# Patient Record
Sex: FEMALE | Race: WHITE | Employment: OTHER | URBAN - NONMETROPOLITAN AREA
[De-identification: names, ages, dates, MRNs, and addresses within clinical notes are randomized per-mention and may not be internally consistent; named-entity substitution may affect disease eponyms.]

---

## 2022-01-29 DIAGNOSIS — C78.7 METASTATIC CANCER TO LIVER (HCC): ICD-10-CM

## 2022-01-29 DIAGNOSIS — C78.7 METASTATIC CANCER TO LIVER (HCC): Primary | ICD-10-CM

## 2022-01-29 RX ORDER — DRONABINOL 2.5 MG/1
2.5 CAPSULE ORAL
Qty: 60 CAPSULE | Refills: 1 | Status: SHIPPED | OUTPATIENT
Start: 2022-01-29 | End: 2022-01-29 | Stop reason: SDUPTHER

## 2022-01-29 RX ORDER — DRONABINOL 2.5 MG/1
2.5 CAPSULE ORAL
Qty: 60 CAPSULE | Refills: 1 | Status: SHIPPED | OUTPATIENT
Start: 2022-01-29 | End: 2022-02-28

## 2022-02-02 NOTE — TELEPHONE ENCOUNTER
([facility] [address]     Hydronephrosis     Kidney stone     Liver cancer ([facility] [address]     Portal vein thrombosis     Primary hypertension       Past Surgical History:   Procedure Laterality Date    APPENDECTOMY      CATARACT REMOVAL WITH IMPLANT Bilateral     CHOLECYSTECTOMY      CYSTOSCOPY      DENTAL SURGERY      INTERNAL RADIATION      JOINT REPLACEMENT      LIVER BIOPSY      OPTIC NERVE DECOMPRESSION      TONSILLECTOMY      URETER STENT PLACEMENT         Family History   Problem Relation Age of Onset    Kidney Cancer Mother     Cancer Father        Social History     Tobacco Use    Smoking status: Former Smoker     Quit date:      Years since quittin.0    Smokeless tobacco: Never Used   Substance Use Topics    Alcohol use: Not Currently      Allergies   Allergen Reactions    Adhesive Tape     Dye [Iodides]        Health Maintenance   Topic Date Due    Hepatitis C screen  Never done    Depression Screen  Never done    COVID-19 Vaccine (1) Never done    DTaP/Tdap/Td vaccine (1 - Tdap) Never done    Lipid screen  Never done    Colon cancer screen colonoscopy  Never done    Breast cancer screen  Never done    Shingles Vaccine (1 of 2) Never done    DEXA (modify frequency per FRAX score)  Never done    Pneumococcal 65+ yrs at Risk Vaccine (1 of 2 - PCV13) Never done    Flu vaccine (1) Never done    Hepatitis A vaccine  Aged Out    Hepatitis B vaccine  Aged Out    Hib vaccine  Aged Out    Meningococcal (ACWY) vaccine  Aged Out       Subjective:      Review of Systems   Constitutional: Positive for activity change, appetite change, fatigue and unexpected weight change. Negative for chills and fever. HENT: Negative for congestion, rhinorrhea and sore throat. Eyes: Negative for redness and visual disturbance. Respiratory: Positive for shortness of breath (with exerion). Negative for cough and wheezing. Cardiovascular: Positive for leg swelling. Negative for chest pain. Gastrointestinal: Positive for abdominal distention and abdominal pain. Negative for constipation, diarrhea, nausea and vomiting. Endocrine: Negative for polydipsia and polyuria. Genitourinary: Negative for dysuria, frequency and hematuria. Musculoskeletal: Negative for arthralgias, gait problem and myalgias. Skin: Negative for rash and wound. Allergic/Immunologic: Negative for environmental allergies and immunocompromised state. Neurological: Positive for weakness. Negative for dizziness, light-headedness and headaches. Hematological: Bruises/bleeds easily. Psychiatric/Behavioral: Negative for dysphoric mood and sleep disturbance. The patient is not nervous/anxious. Objective:     Physical Exam  Vitals and nursing note reviewed. Constitutional:       General: She is not in acute distress. Appearance: She is well-developed. HENT:      Head: Normocephalic and atraumatic. Right Ear: External ear normal.      Left Ear: External ear normal.      Nose: Nose normal.      Mouth/Throat:      Mouth: Mucous membranes are dry. Pharynx: No posterior oropharyngeal erythema. Eyes:      General: No scleral icterus. Right eye: No discharge. Left eye: No discharge. Conjunctiva/sclera: Conjunctivae normal.   Neck:      Thyroid: No thyromegaly. Vascular: No JVD. Cardiovascular:      Rate and Rhythm: Normal rate and regular rhythm. Heart sounds: Normal heart sounds. Pulmonary:      Effort: Pulmonary effort is normal. No respiratory distress. Breath sounds: No stridor. Rales (few in bases) present. No wheezing. Chest:   Breasts:      Right: No supraclavicular adenopathy. Left: No supraclavicular adenopathy. Abdominal:      General: Bowel sounds are normal. There is distension. Palpations: Abdomen is soft. There is hepatomegaly. Tenderness: There is abdominal tenderness in the right upper quadrant.    Musculoskeletal: General: No deformity. Normal range of motion. Right shoulder: No tenderness or bony tenderness. Left shoulder: No tenderness or bony tenderness. Cervical back: Neck supple. No tenderness or bony tenderness. Thoracic back: No spasms, tenderness or bony tenderness. Lumbar back: No tenderness or bony tenderness. Right lower le+ Pitting Edema present. Left lower le+ Pitting Edema present. Lymphadenopathy:      Cervical: No cervical adenopathy. Upper Body:      Right upper body: No supraclavicular adenopathy. Left upper body: No supraclavicular adenopathy. Skin:     General: Skin is warm and dry. Findings: No erythema or rash. Neurological:      Mental Status: She is alert and oriented to person, place, and time. Motor: No atrophy, abnormal muscle tone or seizure activity. Psychiatric:         Attention and Perception: Attention normal.         Speech: Speech normal.         Behavior: Behavior normal.         Cognition and Memory: Cognition normal.       BP (!) 131/58   Pulse 76   Temp 98 °F (36.7 °C)   Resp 18   Wt 194 lb 6.4 oz (88.2 kg)   SpO2 95%     Assessment/Plan      1. Metastatic cancer to liver ([facility] [address]    2. Primary hypertension    3. Graves disease    4. Acute deep vein thrombosis (DVT) of calf muscle vein of left lower extremity (HCC)    5. Adrenal insufficiency ([address]    6. History of COVID-19      1. Therapy to eval and treat. Follows with oncology. Pain control, marinol for appetite  2. bp controlled. Cont lisinopril/hctz. Add aldactone. Repeat bmp in 1 wk  3. Cont synthroid. Follows with endo  4. Elevate. Cont eliquis  5. Cont hydrocortisone as prescribed. Monitor labs  6. Symptomatic control. Vitamin regimen    Patient seen and examined. History partially obtained by chart review and nursing notes. I have reviewed patient's past medical, surgical, social, and family history and have made updates where appropriate.   See facility EMR for updated medication list.    On this date 2/1/2022 I have spent 50 minutes reviewing previous notes, test results and face to face (virtual) with the patient discussing the diagnosis and importance of compliance with the treatment plan as well as documenting on the day of the visit.      Electronically signed by Pricilla Quintero MD on 2/1/2022 at 7:55 PM

## 2023-01-11 ENCOUNTER — OFFICE VISIT (OUTPATIENT)
Dept: OBGYN CLINIC | Age: 73
End: 2023-01-11
Payer: MEDICARE

## 2023-01-11 VITALS
HEIGHT: 61 IN | WEIGHT: 174.6 LBS | SYSTOLIC BLOOD PRESSURE: 120 MMHG | BODY MASS INDEX: 32.97 KG/M2 | DIASTOLIC BLOOD PRESSURE: 60 MMHG

## 2023-01-11 DIAGNOSIS — N88.8 FRIABLE CERVIX: ICD-10-CM

## 2023-01-11 DIAGNOSIS — N81.2 CERVICAL PROLAPSE: Primary | ICD-10-CM

## 2023-01-11 DIAGNOSIS — Z46.89 FITTING AND ADJUSTMENT OF PESSARY: ICD-10-CM

## 2023-01-11 PROCEDURE — G8484 FLU IMMUNIZE NO ADMIN: HCPCS | Performed by: ADVANCED PRACTICE MIDWIFE

## 2023-01-11 PROCEDURE — 99203 OFFICE O/P NEW LOW 30 MIN: CPT | Performed by: ADVANCED PRACTICE MIDWIFE

## 2023-01-11 PROCEDURE — 1090F PRES/ABSN URINE INCON ASSESS: CPT | Performed by: ADVANCED PRACTICE MIDWIFE

## 2023-01-11 PROCEDURE — 1123F ACP DISCUSS/DSCN MKR DOCD: CPT | Performed by: ADVANCED PRACTICE MIDWIFE

## 2023-01-11 PROCEDURE — 57160 INSERT PESSARY/OTHER DEVICE: CPT | Performed by: ADVANCED PRACTICE MIDWIFE

## 2023-01-11 PROCEDURE — G8400 PT W/DXA NO RESULTS DOC: HCPCS | Performed by: ADVANCED PRACTICE MIDWIFE

## 2023-01-11 PROCEDURE — 1036F TOBACCO NON-USER: CPT | Performed by: ADVANCED PRACTICE MIDWIFE

## 2023-01-11 PROCEDURE — G8417 CALC BMI ABV UP PARAM F/U: HCPCS | Performed by: ADVANCED PRACTICE MIDWIFE

## 2023-01-11 PROCEDURE — G8427 DOCREV CUR MEDS BY ELIG CLIN: HCPCS | Performed by: ADVANCED PRACTICE MIDWIFE

## 2023-01-11 PROCEDURE — 3017F COLORECTAL CA SCREEN DOC REV: CPT | Performed by: ADVANCED PRACTICE MIDWIFE

## 2023-01-11 RX ORDER — SPIRONOLACTONE 25 MG/1
25 TABLET ORAL DAILY
COMMUNITY

## 2023-01-11 RX ORDER — LEVOTHYROXINE SODIUM 0.1 MG/1
TABLET ORAL
COMMUNITY
Start: 2022-12-10

## 2023-01-11 RX ORDER — ONDANSETRON 4 MG/1
4 TABLET, FILM COATED ORAL EVERY 8 HOURS PRN
COMMUNITY

## 2023-01-11 RX ORDER — LOPERAMIDE HYDROCHLORIDE 2 MG/1
2 CAPSULE ORAL 4 TIMES DAILY PRN
COMMUNITY

## 2023-01-11 RX ORDER — FUROSEMIDE 40 MG/1
TABLET ORAL
COMMUNITY
Start: 2023-01-10

## 2023-01-11 RX ORDER — LISINOPRIL AND HYDROCHLOROTHIAZIDE 25; 20 MG/1; MG/1
TABLET ORAL
COMMUNITY
Start: 2022-11-09

## 2023-01-11 ASSESSMENT — ENCOUNTER SYMPTOMS
RESPIRATORY NEGATIVE: 1
GASTROINTESTINAL NEGATIVE: 1

## 2023-01-11 NOTE — PROGRESS NOTES
PROBLEM VISIT     Date of service: 2023    Agustina Reid  Is a 74 y.o.  female    PT's PCP is: No primary care provider on file. : 1950                                          HPI Here as new patient for evaluation. Reports for approx 4 months has had more vaginal pressure and 1 month has had spotting in depends. Has increased vaginal pressure with sitting and coughing/sneezing. Recently met with oncologist (Dr Amelia Mejia) and \"not releasing fluid so have not started lasix yet but they need to drain some fluid off also\". On trial medication for cancer - will call their office to find out list.  Not sexually active    Review of Systems   Constitutional: Negative. HENT: Negative. Respiratory: Negative. Gastrointestinal: Negative. Genitourinary:  Positive for frequency, vaginal bleeding and vaginal pain. Negative for difficulty urinating, dysuria, flank pain and urgency. Neurological: Negative. Psychiatric/Behavioral: Negative. No LMP recorded.  Patient is postmenopausal.   OB History    Para Term  AB Living   3       1 3   SAB IAB Ectopic Molar Multiple Live Births   1         3      # Outcome Date GA Lbr Sawyer/2nd Weight Sex Delivery Anes PTL Lv   3             2             1 SAB                 Social History     Tobacco Use   Smoking Status Former    Types: Cigarettes    Quit date: 2017    Years since quittin.0   Smokeless Tobacco Never        Social History     Substance and Sexual Activity   Alcohol Use Not Currently       Allergies: Adhesive tape and Dye [iodides]      Current Outpatient Medications:     furosemide (LASIX) 40 MG tablet, , Disp: , Rfl:     levothyroxine (SYNTHROID) 100 MCG tablet, , Disp: , Rfl:     lisinopril-hydroCHLOROthiazide (PRINZIDE;ZESTORETIC) 20-25 MG per tablet, , Disp: , Rfl:     spironolactone (ALDACTONE) 25 MG tablet, Take 25 mg by mouth daily, Disp: , Rfl:     loperamide (IMODIUM) 2 MG capsule, Take 2 mg by mouth 4 times daily as needed for Diarrhea, Disp: , Rfl:     ondansetron (ZOFRAN) 4 MG tablet, Take 4 mg by mouth every 8 hours as needed for Nausea or Vomiting, Disp: , Rfl:     Social History     Substance and Sexual Activity   Sexual Activity Not on file       Chief Complaint   Patient presents with    New Patient     Pt c/o frequent urination and is on diuretics. Pt states \"something is coming out and is causing bleeding. \"        Physical Exam  Constitutional:       Appearance: Normal appearance. She is obese. Genitourinary:      Genitourinary Comments: When performed examination - noted that cervix and part of uterus has protruded through vaginal opening. Cervix friable and blood is not from center of cervix but from outer edges - likely from irritation from depends wearing. With pressure this prolapse is able to be reinserted into vagina but within 5 minutes it is again prolapsed. Dr Melina Linton to room to assist with examination - applied silver nitrate to cervix for hemostasis. #4 ring with support pessary inserted without difficulty which did seem to hold prolapse well. Patient ambulated in room and reported \"much better\". Cervical friability (cervix found to be friable and excoriated) present. HENT:      Head: Normocephalic. Eyes:      Pupils: Pupils are equal, round, and reactive to light. Pulmonary:      Effort: Pulmonary effort is normal.   Abdominal:      Comments: Distended - ascites   Musculoskeletal:         General: Swelling (Significant pitting bilateral lower extremity edema) present. Right lower leg: Edema present. Left lower leg: Edema present. Neurological:      Mental Status: She is alert and oriented to person, place, and time. Skin:     General: Skin is warm and dry. Psychiatric:         Behavior: Behavior normal.   Vitals and nursing note reviewed. Vital Signs  Blood pressure 120/60, height 5' 1\" (1.549 m), weight 174 lb 9.6 oz (79.2 kg). Assessment and Plan          Diagnosis Orders   1. Cervical prolapse        2. Fitting and adjustment of pessary        3. Friable cervix            Patient leaving town until early next week - return in 2 weeks for check to see if able to continue with this use. May need a #5 with support. Reviewed if pessary does spontaneously expel to call office  Reviewed s/s to call office for  Not a surgical candidate per Dr Rivera Breath      I am having Loretta Jose maintain her furosemide, levothyroxine, lisinopril-hydroCHLOROthiazide, spironolactone, loperamide, and ondansetron. Return pessary check. She was also counseled on her preventative health maintenance recommendations and follow-up. There are no Patient Instructions on file for this visit.     SAVITA Pierson CNM,1/11/2023 12:16 PM                     Electronically signed by SAVITA Pierson CNM

## 2023-01-12 ENCOUNTER — TELEPHONE (OUTPATIENT)
Dept: OBGYN CLINIC | Age: 73
End: 2023-01-12

## 2023-01-12 NOTE — TELEPHONE ENCOUNTER
Please call over to [facility] - I would ideally like most recent MRI results and office notes for patient current state and prognosis with plan of care. She also reported that Dr Negar Zavala is prescribing some new trial type medications for her - can you get a complete list of medications for her.   Thank you

## 2023-01-17 ENCOUNTER — OFFICE VISIT (OUTPATIENT)
Dept: OBGYN CLINIC | Age: 73
End: 2023-01-17
Payer: MEDICARE

## 2023-01-17 VITALS
WEIGHT: 174 LBS | BODY MASS INDEX: 32.85 KG/M2 | DIASTOLIC BLOOD PRESSURE: 70 MMHG | SYSTOLIC BLOOD PRESSURE: 100 MMHG | HEIGHT: 61 IN

## 2023-01-17 DIAGNOSIS — N81.2 CERVICAL PROLAPSE: ICD-10-CM

## 2023-01-17 DIAGNOSIS — Z46.89 FITTING AND ADJUSTMENT OF PESSARY: Primary | ICD-10-CM

## 2023-01-17 PROCEDURE — 57160 INSERT PESSARY/OTHER DEVICE: CPT | Performed by: ADVANCED PRACTICE MIDWIFE

## 2023-01-17 PROCEDURE — A4561 PESSARY RUBBER, ANY TYPE: HCPCS | Performed by: ADVANCED PRACTICE MIDWIFE

## 2023-01-17 NOTE — PROGRESS NOTES
PROBLEM VISIT - pessary check    Date of service: 2023    Venessa Casey  Is a 74 y.o.  female    PT's PCP is: No primary care provider on file. : 1950                                           Allergies: Adhesive tape and Dye [iodides]    Chief Complaint   Patient presents with    Bladder Problem     Pt here because pessary fell out on Thursday. Pt states she is unable to find it. Pt had diarrhea and a gush of blood when using bathroom on Thursday. Pt still has some bleeding when moving around. Also states is crampy. Subjective:     PE:  Vital Signs  Blood pressure 100/70, height 5' 1\" (1.549 m), weight 174 lb (78.9 kg). Labs:    No results found for this visit on 23. HPI:   The patient was seen today for checkup - pessary placed last week and did well initially. Reports Thursday had eaten then sudden urge to have diarrhea - went to bathroom, had a large BM full of diarrhea. Then noted blood also in toilet which she thought was from vagina. Believes pessary fell out in the meantime and was in a hotel and did not find it in toilet but also was unable to look much. With exam - no pessary palpated in vaginal canal, also with speculum exam not seen but also cervix was not prolapsed outside of vaginal opening today. Discussed options - she would like another pessary placed. Saw onc today, has ascites reduction scheduled for tomorrow. No vaginal bleeding was found with spec exam.  Cervix is not friable. She denies to any vaginal discharge or odor. Her bowels are regular (frequent diarrhea due to medications and her voiding pattern is normal.     Objective:  Perineum/Speculum: There is not any signs of infection; The vaginal vault is without any signs of erythema or erosion. There is no vaginal discharge or odor appreciated. The pessary was placed (#5 ring with support and knob) without any problems and the patient tolerated procedure well.      Assessment:   Diagnosis Orders   1. Fitting and adjustment of pessary        2. Cervical prolapse            There are no problems to display for this patient. Plan:  1. Return to the office 2 weeks  2. Report any vaginal bleeding or discharge  3. Abstinence  4. Will have pelvic USN at next visit         She was  counseled on her preventative health maintenance recommendations and follow-up.              FF time: 20 min    Mary Chávez, SAVITA - PATRICM,1/17/2023 3:16 PM

## 2023-02-09 ENCOUNTER — PROCEDURE VISIT (OUTPATIENT)
Dept: OBGYN CLINIC | Age: 73
End: 2023-02-09

## 2023-02-09 ENCOUNTER — HOSPITAL ENCOUNTER (OUTPATIENT)
Age: 73
Setting detail: SPECIMEN
Discharge: HOME | End: 2023-02-09

## 2023-02-09 VITALS — HEIGHT: 61 IN | BODY MASS INDEX: 32.88 KG/M2

## 2023-02-09 DIAGNOSIS — N81.2 CERVICAL PROLAPSE: Primary | ICD-10-CM

## 2023-02-09 DIAGNOSIS — Z46.89 FITTING AND ADJUSTMENT OF PESSARY: ICD-10-CM

## 2023-02-09 DIAGNOSIS — R93.5 ABNORMAL ENDOMETRIAL ULTRASOUND: ICD-10-CM

## 2023-02-09 DIAGNOSIS — R93.5 ABNORMAL ULTRASOUND OF UTERUS: ICD-10-CM

## 2023-02-09 NOTE — PROGRESS NOTES
PROBLEM VISIT - pessary check    Date of service: 2023    Mariposa Mcfadden  Is a 74 y.o.  female    PT's PCP is: No primary care provider on file. : 1950                                           Allergies: Adhesive tape and Dye [iodides]    Chief Complaint   Patient presents with    Procedure     Pt here for pessary check. Follow-up     Pt here for USN f/u. Subjective:     PE:  Vital Signs  Height 5' 1\" (1.549 m). Labs:    No results found for this visit on 23. HPI:   The patient was seen today for pessary check and maintenance. Also - USN f/u today. USN ordered secondary to pelvic pressure and initial episodes of bleeding that had discussed in the past. She denies any further vaginal bleeding since Dr Phill Huynh did silver nitrate her cervix. She denies to any vaginal discharge or odor, although pessary is odorous (was removed in the [address] [place] [address] room prior to her USN being done). Her bowels are regular and her voiding pattern is normal.  States \"much better with this pessary instead of the other one\". Objective:  Perineum/Speculum: There is not any signs of infection aside from vaginal odor; The vaginal vault is without any signs of erythema or erosion. There is no vaginal discharge but odor is appreciated. The #5 with knob/support pessary was removed, cleansed, and replaced using trimosan ointment without any problems and the patient tolerated procedure well. USN today:  Uterus 7.0 x 4.0 x 3.6cm  Endometrium 1.5mm, complex appearing in areas  Fluid is seen within the endometrial canal  Ovaries not seen  Multiple tortuous vessels in the left adnexa      EMB: performed today - procedure explained to pt - verbal consent obtained.   Assistant was [employer], [place].  EMB was recommended to evaluate complex areas in endometrium and fluid present.     - large speculum paced in vaginal canal  - cervix visualized  - cervix prepped with betadine solution  - hurricane spray  was not used  - tenaculum was used  - os finder used and uterus sounded to 7cm  - pipette used to obtain specimen which was light yellow and odorous   - pt tolerated procedure well. Assessment:   Diagnosis Orders   1. Cervical prolapse        2. Fitting and adjustment of pessary        3. Abnormal endometrial ultrasound  Surgical Pathology      4. Abnormal ultrasound of uterus  Surgical Pathology          There are no problems to display for this patient. Plan:  1. Return to the office 10-12 weeks  2. Report any vaginal bleeding or discharge  3. Abstinence  4. We will call her with endo bx results and next steps         She was  counseled on her preventative health maintenance recommendations and follow-up.        In addition to time for USN reviewed and follow up with pessary cleaning ([id number]) I also performed the procedure (endo bx)     SAVITA Mendez - CACHORRO,2/9/2023 12:15 PM

## 2023-04-27 ENCOUNTER — PROCEDURE VISIT (OUTPATIENT)
Dept: OBGYN CLINIC | Age: 73
End: 2023-04-27
Payer: MEDICARE

## 2023-04-27 VITALS
HEIGHT: 61 IN | SYSTOLIC BLOOD PRESSURE: 120 MMHG | WEIGHT: 147.6 LBS | DIASTOLIC BLOOD PRESSURE: 50 MMHG | BODY MASS INDEX: 27.87 KG/M2

## 2023-04-27 DIAGNOSIS — N89.8 VAGINAL ODOR: ICD-10-CM

## 2023-04-27 DIAGNOSIS — N81.2 CERVICAL PROLAPSE: Primary | ICD-10-CM

## 2023-04-27 DIAGNOSIS — Z46.89 PESSARY MAINTENANCE: ICD-10-CM

## 2023-04-27 PROCEDURE — G8400 PT W/DXA NO RESULTS DOC: HCPCS | Performed by: ADVANCED PRACTICE MIDWIFE

## 2023-04-27 PROCEDURE — 1036F TOBACCO NON-USER: CPT | Performed by: ADVANCED PRACTICE MIDWIFE

## 2023-04-27 PROCEDURE — 1090F PRES/ABSN URINE INCON ASSESS: CPT | Performed by: ADVANCED PRACTICE MIDWIFE

## 2023-04-27 PROCEDURE — 99214 OFFICE O/P EST MOD 30 MIN: CPT | Performed by: ADVANCED PRACTICE MIDWIFE

## 2023-04-27 PROCEDURE — G8417 CALC BMI ABV UP PARAM F/U: HCPCS | Performed by: ADVANCED PRACTICE MIDWIFE

## 2023-04-27 PROCEDURE — 1123F ACP DISCUSS/DSCN MKR DOCD: CPT | Performed by: ADVANCED PRACTICE MIDWIFE

## 2023-04-27 PROCEDURE — 3017F COLORECTAL CA SCREEN DOC REV: CPT | Performed by: ADVANCED PRACTICE MIDWIFE

## 2023-04-27 PROCEDURE — G8427 DOCREV CUR MEDS BY ELIG CLIN: HCPCS | Performed by: ADVANCED PRACTICE MIDWIFE

## 2023-04-27 RX ORDER — LEVOTHYROXINE SODIUM 0.07 MG/1
TABLET ORAL
COMMUNITY
Start: 2023-03-15

## 2023-04-27 RX ORDER — CITALOPRAM 20 MG/1
TABLET ORAL
COMMUNITY
Start: 2023-04-25

## 2023-04-27 RX ORDER — OXYQUINOLINE/BORIC ACID 0.025 %
JELLY WITH APPLICATOR (GRAM) VAGINAL
Qty: 1 EACH | Refills: 0 | Status: SHIPPED | OUTPATIENT
Start: 2023-04-27

## 2023-04-27 NOTE — PROGRESS NOTES
PROBLEM VISIT - pessary check    Date of service: 2023    Agustina Reid  Is a 74 y.o.  female    PT's PCP is: No primary care provider on file. : 1950                                           Allergies: Adhesive tape and Dye [iodides]    Chief Complaint   Patient presents with    Procedure     Pt here for pessary cleaning. Pt denies concerns. Subjective:     PE:  Vital Signs  Blood pressure (!) 120/50, height 5' 1\" (1.549 m), weight 147 lb 9.6 oz (67 kg). Labs:    No results found for this visit on 23. HPI:   The patient was seen today for pessary maintenance. She denies any vaginal bleeding. She admits to to any vaginal odor intermittently and denies vaginal discharge. Her bowels are regular and her voiding pattern is normal.     Objective:  Perineum/Speculum: There is not any signs of infection; The vaginal vault is without any signs of erythema or erosion. There is no vaginal discharge or odor appreciated. The pessary was  #5 with knob and support, removed, cleansed, and replacedwithout any problems and the patient tolerated procedure well. Due to intermittent odor - no symptoms of infection today - will presscribe trimosan for use weekly - 1 applicator. Discussed process with patient. She was agreeable    Assessment:   Diagnosis Orders   1. Cervical prolapse        2. Pessary maintenance        3. Vaginal odor            There are no problems to display for this patient. Plan:  1. Return to the office 10-12 weeks  2. Report any vaginal bleeding or discharge  3. Abstinence         She was  counseled on her preventative health maintenance recommendations and follow-up.              FF time: 25 min    SAVITA Pruitt CNM,2023 11:40 AM

## 2023-07-27 ENCOUNTER — PROCEDURE VISIT (OUTPATIENT)
Dept: OBGYN CLINIC | Age: 73
End: 2023-07-27
Payer: MEDICARE

## 2023-07-27 VITALS
HEIGHT: 61 IN | BODY MASS INDEX: 30.21 KG/M2 | WEIGHT: 160 LBS | SYSTOLIC BLOOD PRESSURE: 130 MMHG | DIASTOLIC BLOOD PRESSURE: 60 MMHG

## 2023-07-27 DIAGNOSIS — Z46.89 PESSARY MAINTENANCE: ICD-10-CM

## 2023-07-27 DIAGNOSIS — N81.2 CERVICAL PROLAPSE: Primary | ICD-10-CM

## 2023-07-27 DIAGNOSIS — N88.8 FRIABLE CERVIX: ICD-10-CM

## 2023-07-27 PROCEDURE — 1123F ACP DISCUSS/DSCN MKR DOCD: CPT | Performed by: ADVANCED PRACTICE MIDWIFE

## 2023-07-27 PROCEDURE — 1090F PRES/ABSN URINE INCON ASSESS: CPT | Performed by: ADVANCED PRACTICE MIDWIFE

## 2023-07-27 PROCEDURE — G8427 DOCREV CUR MEDS BY ELIG CLIN: HCPCS | Performed by: ADVANCED PRACTICE MIDWIFE

## 2023-07-27 PROCEDURE — G8417 CALC BMI ABV UP PARAM F/U: HCPCS | Performed by: ADVANCED PRACTICE MIDWIFE

## 2023-07-27 PROCEDURE — 1036F TOBACCO NON-USER: CPT | Performed by: ADVANCED PRACTICE MIDWIFE

## 2023-07-27 PROCEDURE — G8400 PT W/DXA NO RESULTS DOC: HCPCS | Performed by: ADVANCED PRACTICE MIDWIFE

## 2023-07-27 PROCEDURE — 99213 OFFICE O/P EST LOW 20 MIN: CPT | Performed by: ADVANCED PRACTICE MIDWIFE

## 2023-07-27 PROCEDURE — 3017F COLORECTAL CA SCREEN DOC REV: CPT | Performed by: ADVANCED PRACTICE MIDWIFE

## 2023-07-27 RX ORDER — FUROSEMIDE 20 MG/1
TABLET ORAL
COMMUNITY
Start: 2023-07-25

## 2023-07-27 NOTE — PROGRESS NOTES
PROBLEM VISIT - pessary check    Date of service: 2023    Bigg Ayon  Is a 74 y.o.  female    PT's PCP is: No primary care provider on file. : 1950                                           Allergies: Adhesive tape and Dye [iodides]    Chief Complaint   Patient presents with    Procedure     Pt here for pessary cleaning. Pt has had some occasional bleeding that lasts for about 1-2 days. Happens when does heavy cleaning or lifting. Subjective:     PE:  Vital Signs  Blood pressure 130/60, height 5' 1\" (1.549 m), weight 160 lb (72.6 kg). Labs:    No results found for this visit on 23. HPI:   The patient was seen today for pessary maintenance. She admits to any vaginal bleeding - onset x 3 weeks with light spotting for 1-1.5 days after doing a lot of physical activity and lifting - is trying to clean things up from her daughter passing away in march. She denies to any vaginal discharge or odor. Her bowels are regular but loose due to medications she takes and her voiding pattern is normal but frequent with lasix. Objective:  Perineum/Speculum: There is not any signs of infection; The vaginal vault is not without any signs of erythema or erosion - cervix had area at 5 o clock that was friable - similar appearance to when first came to our office and Dr Bhavik Queen applied silver nitrate at that time - repeated this today. There is not no vaginal discharge or odor appreciated. The #5 ring with knob and support pessary was   , removed, cleansed, and replacedwithout any problems and the patient tolerated procedure well. Assessment:   Diagnosis Orders   1. Cervical prolapse        2. Pessary maintenance        3. Friable cervix            There are no problems to display for this patient. Plan:  1. Return to the office 10 weeks  2. Report any vaginal bleeding or discharge  3. Abstinence  4.  Continue with trimosan once a week         She was  counseled on her

## 2023-10-16 ENCOUNTER — PROCEDURE VISIT (OUTPATIENT)
Dept: OBGYN CLINIC | Age: 73
End: 2023-10-16
Payer: MEDICARE

## 2023-10-16 VITALS
WEIGHT: 171 LBS | BODY MASS INDEX: 32.28 KG/M2 | HEIGHT: 61 IN | SYSTOLIC BLOOD PRESSURE: 118 MMHG | DIASTOLIC BLOOD PRESSURE: 60 MMHG

## 2023-10-16 DIAGNOSIS — N81.2 CERVICAL PROLAPSE: Primary | ICD-10-CM

## 2023-10-16 DIAGNOSIS — Z46.89 PESSARY MAINTENANCE: ICD-10-CM

## 2023-10-16 PROCEDURE — 1123F ACP DISCUSS/DSCN MKR DOCD: CPT | Performed by: ADVANCED PRACTICE MIDWIFE

## 2023-10-16 PROCEDURE — G8427 DOCREV CUR MEDS BY ELIG CLIN: HCPCS | Performed by: ADVANCED PRACTICE MIDWIFE

## 2023-10-16 PROCEDURE — G8417 CALC BMI ABV UP PARAM F/U: HCPCS | Performed by: ADVANCED PRACTICE MIDWIFE

## 2023-10-16 PROCEDURE — G8400 PT W/DXA NO RESULTS DOC: HCPCS | Performed by: ADVANCED PRACTICE MIDWIFE

## 2023-10-16 PROCEDURE — 1090F PRES/ABSN URINE INCON ASSESS: CPT | Performed by: ADVANCED PRACTICE MIDWIFE

## 2023-10-16 PROCEDURE — 1036F TOBACCO NON-USER: CPT | Performed by: ADVANCED PRACTICE MIDWIFE

## 2023-10-16 PROCEDURE — 99213 OFFICE O/P EST LOW 20 MIN: CPT | Performed by: ADVANCED PRACTICE MIDWIFE

## 2023-10-16 PROCEDURE — 3017F COLORECTAL CA SCREEN DOC REV: CPT | Performed by: ADVANCED PRACTICE MIDWIFE

## 2023-10-16 PROCEDURE — G8484 FLU IMMUNIZE NO ADMIN: HCPCS | Performed by: ADVANCED PRACTICE MIDWIFE

## 2023-10-16 RX ORDER — BUMETANIDE 2 MG/1
TABLET ORAL
COMMUNITY
Start: 2023-10-10

## 2023-10-16 RX ORDER — APIXABAN 2.5 MG/1
TABLET, FILM COATED ORAL
COMMUNITY
Start: 2023-10-13

## 2023-10-16 RX ORDER — CALCITRIOL 0.25 UG/1
1 CAPSULE, LIQUID FILLED ORAL
COMMUNITY
Start: 2023-08-31 | End: 2024-01-08

## 2023-10-16 NOTE — PROGRESS NOTES
PROBLEM VISIT - pessary check    Date of service: 10/16/2023    Opal Finnegan  Is a 74 y.o.  female    PT's PCP is: No primary care provider on file. : 1950                                           Allergies: Adhesive tape and Dye [iodides]    Chief Complaint   Patient presents with    Procedure     Pt here for pessary cleaning. Denies concerns. Subjective:     PE:  Vital Signs  Blood pressure 118/60, height 5' 1\" (1.549 m), weight 171 lb (77.6 kg). Labs:    No results found for this visit on 10/16/23. HPI:   The patient was seen today for pessary maintenance. She denies any vaginal bleeding. She denies to any vaginal discharge or odor. Her bowels are regular and her voiding pattern is normal.     Repeat scan tomorrow at [facility], meeting with Dr Carley Arcos next week \"making some really hard decisions\" regarding treatment continuation or cessation    Objective:  Perineum/Speculum: There is not any signs of infection; The vaginal vault is without any signs of erythema or erosion. There is no vaginal discharge or odor appreciated. The #5 ring with support and knob pessary was removed, cleansed, and replacedwithout any problems and the patient tolerated procedure well. Assessment:   Diagnosis Orders   1. Cervical prolapse        2. Pessary maintenance            There are no problems to display for this patient. Plan:  1. Return to the office 8 weeks  2. Report any vaginal bleeding or discharge  3. Abstinence         She was  counseled on her preventative health maintenance recommendations and follow-up.              FF time: 25 min    SAVITA MALIK CNM,10/16/2023 12:12 PM

## 2024-02-26 ENCOUNTER — PROCEDURE VISIT (OUTPATIENT)
Dept: OBGYN CLINIC | Age: 74
End: 2024-02-26
Payer: MEDICARE

## 2024-02-26 ENCOUNTER — TELEPHONE (OUTPATIENT)
Dept: OBGYN CLINIC | Age: 74
End: 2024-02-26

## 2024-02-26 VITALS — HEIGHT: 61 IN | SYSTOLIC BLOOD PRESSURE: 128 MMHG | BODY MASS INDEX: 32.31 KG/M2 | DIASTOLIC BLOOD PRESSURE: 60 MMHG

## 2024-02-26 DIAGNOSIS — B37.9 CANDIDA INFECTION: ICD-10-CM

## 2024-02-26 DIAGNOSIS — Z46.89 ENCOUNTER FOR PESSARY MAINTENANCE: Primary | ICD-10-CM

## 2024-02-26 PROCEDURE — G8484 FLU IMMUNIZE NO ADMIN: HCPCS | Performed by: ADVANCED PRACTICE MIDWIFE

## 2024-02-26 PROCEDURE — 1123F ACP DISCUSS/DSCN MKR DOCD: CPT | Performed by: ADVANCED PRACTICE MIDWIFE

## 2024-02-26 PROCEDURE — G8400 PT W/DXA NO RESULTS DOC: HCPCS | Performed by: ADVANCED PRACTICE MIDWIFE

## 2024-02-26 PROCEDURE — 1036F TOBACCO NON-USER: CPT | Performed by: ADVANCED PRACTICE MIDWIFE

## 2024-02-26 PROCEDURE — G8427 DOCREV CUR MEDS BY ELIG CLIN: HCPCS | Performed by: ADVANCED PRACTICE MIDWIFE

## 2024-02-26 PROCEDURE — 3017F COLORECTAL CA SCREEN DOC REV: CPT | Performed by: ADVANCED PRACTICE MIDWIFE

## 2024-02-26 PROCEDURE — 1090F PRES/ABSN URINE INCON ASSESS: CPT | Performed by: ADVANCED PRACTICE MIDWIFE

## 2024-02-26 PROCEDURE — 99214 OFFICE O/P EST MOD 30 MIN: CPT | Performed by: ADVANCED PRACTICE MIDWIFE

## 2024-02-26 PROCEDURE — G8417 CALC BMI ABV UP PARAM F/U: HCPCS | Performed by: ADVANCED PRACTICE MIDWIFE

## 2024-02-26 NOTE — PROGRESS NOTES
area tomorrow at PCP office.     Assessment:   Diagnosis Orders   1. Encounter for pessary maintenance        2. Candida infection            There are no problems to display for this patient.          Plan:  1. Return to the office 10 weeks  2. Report any vaginal bleeding or discharge  3. Abstinence  4. PCP tomorrow for full eval including area of yeast to right upper thigh and right side of panus         She was  counseled on her preventative health maintenance recommendations and follow-up.             FF time: 30 min    SAVITA MALIK - CACHORRO,2/26/2024 2:12 PM